# Patient Record
Sex: FEMALE | Race: WHITE | URBAN - METROPOLITAN AREA
[De-identification: names, ages, dates, MRNs, and addresses within clinical notes are randomized per-mention and may not be internally consistent; named-entity substitution may affect disease eponyms.]

---

## 2020-12-16 ENCOUNTER — HOSPITAL ENCOUNTER (OUTPATIENT)
Dept: PHYSICAL THERAPY | Age: 30
Setting detail: THERAPIES SERIES
Discharge: HOME | End: 2020-12-16
Payer: COMMERCIAL

## 2020-12-16 PROCEDURE — 97112 NEUROMUSCULAR REEDUCATION: CPT

## 2020-12-16 PROCEDURE — 97161 PT EVAL LOW COMPLEX 20 MIN: CPT

## 2020-12-16 ASSESSMENT — PAIN SCALES - GENERAL: PAINLEVEL_OUTOF10: 4

## 2020-12-16 ASSESSMENT — PAIN DESCRIPTION - ORIENTATION: ORIENTATION: RIGHT

## 2020-12-16 ASSESSMENT — PAIN DESCRIPTION - LOCATION: LOCATION: BACK

## 2020-12-16 ASSESSMENT — PAIN DESCRIPTION - PAIN TYPE: TYPE: CHRONIC PAIN

## 2020-12-16 NOTE — FLOWSHEET NOTE
[facility]  [facility]. and Therapy, [facility] [address] [place]   Phone: [phone]  Fax [phone]    Physical Therapy Daily Treatment Note    Date:  2020    Patient Name:  Maxine Sinha    :  1990  MRN: [id number]  Restrictions/Precautions:    Medical/Treatment Diagnosis Information:  · Diagnosis: lower back pain  Insurance/Certification information:  PT Insurance Information: Medical [place]  Physician Information:  Referring Practitioner: Rebecca De Leon MD  Plan of care signed (Y/N):  N  Visit# / total visits:       G-Code (if applicable):          DANIELA 15/50    Time in:   2:05      Timed Treatment: 10 Total Treatment Time:  45  ________________________________________________________________________________________    Pain Level:    /10  SUBJECTIVE:  See eval    OBJECTIVE:     Exercise/Equipment Resistance/Repetitions Other comments                                                                                                                                             Other Therapeutic Activities:  Pt educated on anatomy and biomechanics of lumbar spine, sacrum, SIJ, pelvis, hips, associated musculature and biomechanics of movement/ambulation.     Manual Treatments:         Modalities:      Test/Measurements:  See eval       ASSESSMENT:     See eval    Treatment/Activity Tolerance:   []Patient tolerated treatment well [] Patient limited by fatique  []Patient limited by pain [] Patient limited by other medical complications  [] Other:     Goals:    Short term goals  Time Frame for Short term goals: 4 weeks  Short term goal 1: pt will be indep in HEP  Short term goal 2: pt will decrease pain 50%  Short term goal 3: pt will return to clinic with symmetrical pelvic alignment  Short term goal 4: pt will increase B lumbopelvic stabilizer strength to 4/5  Short term goal 5: pt will ambulate with increase glut activation BLE Plan: [x] Continue per plan of care [] Alter current plan (see comments)   [x] Plan of care initiated [] Hold pending MD visit [] Discharge      Plan for Next Session: Biomechanical correction of problems as they present. Facilitate correct muscle firing patterns and activation with appropriate activities. Progress patient as tolerated.        Re-Certification Due Date:         Signature:  Aubrey Montero PT

## 2020-12-16 NOTE — PROGRESS NOTES
Physical Therapy  Initial Assessment  Date: 2020  Patient Name: Bobo Dickens  MRN: [id number]  : 1990     Restrictions  Position Activity Restriction  Other position/activity restrictions: none    Subjective   General  Chart Reviewed: Yes  Patient assessed for rehabilitation services?: Yes  Additional Pertinent Hx: N/A  Referring Practitioner: Michaelle Grigsby MD  Referral Date : 20  Diagnosis: lower back pain  General Comment  Comments: PLOF: indep in ADLs, lifiting heavy weights, playing dungeons and dragons  PT Visit Information  Onset Date: 20  PT Insurance Information: Medical [place]  Subjective  Subjective: Pt reports that she has a lot of low back pain. Notes that in the last 2-3 months, the pain has gotten worse. Really bad at night and in the morning with random flare ups during the day. Notes shooting pains from low back to R hip. Pt reports no radiating symptoms past her R hip. Denies N&T. Denies changes in B&B. Pt reports that sitting is her best position. Standing is the worse. Most comfortable lying position is on her side with pillow between the legs. No prior back pain. Sleeping is okay once she is asleep. Pt works as a psychologist full-time.   Pain Screening  Patient Currently in Pain: Yes  Pain Assessment  Pain Assessment: 0-10  Pain Level: 4  Pain Type: Chronic pain  Pain Location: Back  Pain Orientation: Right  Vital Signs  Patient Currently in Pain: Yes    Objective     Observation/Palpation  Posture: Fair  Palpation: standing - RLE pronation, iliac crest level; supine - RLE longer, R ASIS inferior; prone - R PSIS superior  Observation: L3/4 hinge, lumbar extension pattern upon return from flexion  Body Mechanics: gait - lumbar extension rotation, decreased push off and glut activation    AROM RLE (degrees)  R Hip External Rotation 0-45: 0-20*  PROM LLE (degrees)  L Hip External Rotation 0-45: 0-25*  Spine  Lumbar: WNL in all directions  Joint Mobility  Spine:

## 2020-12-23 ENCOUNTER — HOSPITAL ENCOUNTER (OUTPATIENT)
Dept: PHYSICAL THERAPY | Age: 30
Setting detail: THERAPIES SERIES
Discharge: HOME | End: 2020-12-23
Payer: COMMERCIAL

## 2020-12-23 NOTE — PROGRESS NOTES
Physical Therapy  Cancellation/No-show Note  Patient Name:  Junior Deleon  :  1990   Date:  2020  Cancels to date: 1  No-shows to date: 0    For today's appointment patient:  [x] Cancelled  [] Rescheduled appointment  [] No-show     Reason given by patient:  [] Patient ill  [] Conflicting appointment  [] No transportation    [] Conflict with work  [] No reason given  [x] Other:     Comments:  Pt arrived 15 minutes late, stated that she thought her appointment was at 8:15. She then rescheduled for tomorrow morning.     Electronically signed by:  Paula Casas PT

## 2020-12-24 ENCOUNTER — HOSPITAL ENCOUNTER (OUTPATIENT)
Dept: PHYSICAL THERAPY | Age: 30
Setting detail: THERAPIES SERIES
Discharge: HOME | End: 2020-12-24
Payer: COMMERCIAL

## 2020-12-24 PROCEDURE — 97110 THERAPEUTIC EXERCISES: CPT

## 2020-12-24 PROCEDURE — 97140 MANUAL THERAPY 1/> REGIONS: CPT

## 2020-12-24 NOTE — FLOWSHEET NOTE
[facility], [facility], [address] [facility] [facility], [address] [place]   Phone: [phone]  Fax [phone]    Physical Therapy Daily Treatment Note    Date:  2020    Patient Name:  Abdiel Mcknight    :  1990  MRN: [id number]  Restrictions/Precautions:    Medical/Treatment Diagnosis Information:  · Diagnosis: lower back pain  Insurance/Certification information:  PT Insurance Information: Medical [place]  Physician Information:  Referring Practitioner: Angie Carmona MD  Plan of care signed (Y/N):  N  Visit# / total visits:       G-Code (if applicable):          DANIELA 15/50    Time in:   7:30      Timed Treatment: 45 Total Treatment Time:  45  ________________________________________________________________________________________    Pain Level:    /10  SUBJECTIVE:  Pt reports that she has had a lot of pain in her hips and back the last week. OBJECTIVE:     Exercise/Equipment Resistance/Repetitions Other comments   TG 5 min End of session   bridge x10    TA set     BKF 5 min  x10 B BP cuff   Supine clams  SL hip ER with stabilization x10  x10 B Blue TB                                                                                                                 Other Therapeutic Activities:  Pt educated on anatomy and biomechanics of lumbar spine, sacrum, SIJ, pelvis, hips, associated musculature and biomechanics of movement/ambulation. Manual Treatments: scissors MET for R AI; shotgun technique with cavitation        Modalities:      Test/Measurements:  See eval       ASSESSMENT:     Pt tolerated session well. Corrected SIJD through manual therapy. Initiated HEP without issue.     Treatment/Activity Tolerance:   []Patient tolerated treatment well [] Patient limited by fatique  []Patient limited by pain [] Patient limited by other medical complications  [] Other:     Goals:    Short term goals  Time Frame for Short term goals: 4 weeks  Short term goal 1: pt will be indep in HEP  Short term goal 2: pt will decrease pain 50%  Short term goal 3: pt will return to clinic with symmetrical pelvic alignment  Short term goal 4: pt will increase B lumbopelvic stabilizer strength to 4/5  Short term goal 5: pt will ambulate with increase glut activation BLE           Plan: [x] Continue per plan of care [] Alter current plan (see comments)   [] Plan of care initiated [] Hold pending MD visit [] Discharge      Plan for Next Session: Biomechanical correction of problems as they present. Facilitate correct muscle firing patterns and activation with appropriate activities. Progress patient as tolerated.        Re-Certification Due Date:         Signature:  Glinda Cushing , PT

## 2020-12-29 ENCOUNTER — HOSPITAL ENCOUNTER (OUTPATIENT)
Dept: PHYSICAL THERAPY | Age: 30
Setting detail: THERAPIES SERIES
Discharge: HOME | End: 2020-12-29
Payer: COMMERCIAL

## 2020-12-29 PROCEDURE — 97110 THERAPEUTIC EXERCISES: CPT

## 2020-12-29 PROCEDURE — 97140 MANUAL THERAPY 1/> REGIONS: CPT

## 2020-12-29 NOTE — FLOWSHEET NOTE
[facility]. and Therapy, [facility] [place], [address] [place] [address]  Phone: [phone]  Fax [phone]    Physical Therapy Daily Treatment Note    Date:  2020    Patient Name:  Katrin Corea    :  1990  MRN: [id number]  Restrictions/Precautions:    Medical/Treatment Diagnosis Information:  · Diagnosis: lower back pain  Insurance/Certification information:  PT Insurance Information: Medical [place]  Physician Information:  Referring Practitioner: Beverly Gonzalez MD  Plan of care signed (Y/N):  N  Visit# / total visits:  3/8     G-Code (if applicable):          DANIELA 15/50    Time in:   11:30     Timed Treatment: 40 Total Treatment Time:  40  ________________________________________________________________________________________    Pain Level:    /10  SUBJECTIVE:  Pt reports that she has been having some pain in her hips at night. \"Intense pains. \" Pt does note that she has been having less lower back pain. OBJECTIVE:     Exercise/Equipment Resistance/Repetitions Other comments   TG 5 min End of session   bridge x10    TA set     BKF     Marches 15x5\"  x10 B  x10 B BP cuff   Supine clams  SL hip ER with stabilization  Blue TB   Lumbopelvic stabilization 2x1min    PHE  x10 B                                                                                                    Other Therapeutic Activities:  Pt educated on anatomy and biomechanics of lumbar spine, sacrum, SIJ, pelvis, hips, associated musculature and biomechanics of movement/ambulation. [id number]    Manual Treatments: scissors MET for R AI; shotgun technique with cavitation; BLE hip inferior distraction      Modalities:      Test/Measurements:  See eval       ASSESSMENT:     Pt tolerated session well. Corrected SIJD through manual therapy. Pt required moderate verbal cueing for lumbopelvic stabilization.      Treatment/Activity Tolerance:   []Patient tolerated treatment well [] Patient limited by fatique  []Patient limited by pain [] Patient limited by other medical complications  [] Other:     Goals:    Short term goals  Time Frame for Short term goals: 4 weeks  Short term goal 1: pt will be indep in HEP  Short term goal 2: pt will decrease pain 50%  Short term goal 3: pt will return to clinic with symmetrical pelvic alignment  Short term goal 4: pt will increase B lumbopelvic stabilizer strength to 4/5  Short term goal 5: pt will ambulate with increase glut activation BLE           Plan: [x] Continue per plan of care [] Alter current plan (see comments)   [] Plan of care initiated [] Hold pending MD visit [] Discharge      Plan for Next Session: Biomechanical correction of problems as they present. Facilitate correct muscle firing patterns and activation with appropriate activities. Progress patient as tolerated.        Re-Certification Due Date:         Signature:  Maria Del Carmen Hobson , PT

## 2020-12-31 ENCOUNTER — HOSPITAL ENCOUNTER (OUTPATIENT)
Dept: PHYSICAL THERAPY | Age: 30
Setting detail: THERAPIES SERIES
Discharge: HOME | End: 2020-12-31
Payer: COMMERCIAL

## 2020-12-31 PROCEDURE — 97140 MANUAL THERAPY 1/> REGIONS: CPT

## 2020-12-31 PROCEDURE — 97110 THERAPEUTIC EXERCISES: CPT

## 2020-12-31 NOTE — FLOWSHEET NOTE
[facility]  [facility] and Therapy, [facility] [address], [address] [place]   Phone: [phone]  Fax [phone]    Physical Therapy Daily Treatment Note    Date:  2020    Patient Name:  Dante Zavaleta    :  1990  MRN: [id number]  Restrictions/Precautions:    Medical/Treatment Diagnosis Information:  · Diagnosis: lower back pain  Insurance/Certification information:  PT Insurance Information: Medical [place]  Physician Information:  Referring Practitioner: Desiree Hooper MD  Plan of care signed (Y/N):  N  Visit# / total visits:       G-Code (if applicable):          DANIELA 15/50    Time in:   8:22     Timed Treatment: 40 Total Treatment Time:  40  ________________________________________________________________________________________    Pain Level:    /10  SUBJECTIVE:  Pt reports that her hips and back are painful today. Notes that she went to the gym yesterday and is renovating her bathroom. OBJECTIVE:     Exercise/Equipment Resistance/Repetitions Other comments   TG 5 min End of session   bridge x10    TA set     BKF     Marches 15x5\"  x10 B  x10 B BP cuff   Supine clams  SL hip ER with stabilization  Blue TB   Lumbopelvic stabilization 2x1min    PHE  x10 B    Quadruped multif hip lift x10 B                                                                                             Other Therapeutic Activities:  Pt educated on anatomy and biomechanics of lumbar spine, sacrum, SIJ, pelvis, hips, associated musculature and biomechanics of movement/ambulation. [id number]    Manual Treatments: scissors MET for R AI; shotgun technique with cavitation; BLE hip inferior distraction      Modalities:      Test/Measurements:  See eval       ASSESSMENT:     Pt tolerated session well. Corrected SIJD through manual therapy. Pt required moderate verbal cueing for lumbopelvic stabilization.      Treatment/Activity Tolerance:   []Patient tolerated treatment well [] Patient limited by fatique  []Patient limited by pain [] Patient limited by other medical complications  [] Other:     Goals:    Short term goals  Time Frame for Short term goals: 4 weeks  Short term goal 1: pt will be indep in HEP  Short term goal 2: pt will decrease pain 50%  Short term goal 3: pt will return to clinic with symmetrical pelvic alignment  Short term goal 4: pt will increase B lumbopelvic stabilizer strength to 4/5  Short term goal 5: pt will ambulate with increase glut activation BLE           Plan: [x] Continue per plan of care [] Alter current plan (see comments)   [] Plan of care initiated [] Hold pending MD visit [] Discharge      Plan for Next Session: Biomechanical correction of problems as they present. Facilitate correct muscle firing patterns and activation with appropriate activities. Progress patient as tolerated.        Re-Certification Due Date:         Signature:  Rosita Tillman PT

## 2021-01-05 ENCOUNTER — HOSPITAL ENCOUNTER (OUTPATIENT)
Dept: PHYSICAL THERAPY | Age: 31
Setting detail: THERAPIES SERIES
Discharge: HOME | End: 2021-01-05

## 2021-01-05 PROCEDURE — 97110 THERAPEUTIC EXERCISES: CPT

## 2021-01-05 NOTE — FLOWSHEET NOTE
[facility], [facility], [address]  Phone: [phone]  Fax [phone]    Physical Therapy Daily Treatment Note    Date:  2021    Patient Name:  Cesar Veliz    :  1990  MRN: [id number]  Restrictions/Precautions:    Medical/Treatment Diagnosis Information:  · Diagnosis: lower back pain  Insurance/Certification information:  PT Insurance Information: Medical [place]  Physician Information:  Referring Practitioner: Eleonora Vidales MD  Plan of care signed (Y/N):  N  Visit# / total visits:       G-Code (if applicable):          DANIELA 15/50    Time in:   8:22     Timed Treatment: 40 Total Treatment Time:  40  ________________________________________________________________________________________    Pain Level:    /10  SUBJECTIVE:  Pt reports that she is having less pain overall. Notes this morning she is only having a little pain. Started Taekwondo yesterday. OBJECTIVE:     Exercise/Equipment Resistance/Repetitions Other comments   TG 5 min End of session   bridge x10    TA set     BKF     Marches 15x5\"  x10 B  x10 B BP cuff   Supine clams  SL hip ER with stabilization  Blue TB   Lumbopelvic stabilization 2x1min    PHE     Quadruped multif hip lift     3-way ball compression 5x10\" ea                    multif press x15 B    B shoulder extension x15                                                       Other Therapeutic Activities:  Pt educated on anatomy and biomechanics of lumbar spine, sacrum, SIJ, pelvis, hips, associated musculature and biomechanics of movement/ambulation. [id number]    Manual Treatments: ; shotgun technique with cavitation; BLE hip inferior distraction      Modalities:      Test/Measurements:  See eval       ASSESSMENT:     Pt tolerated session well. No SIJD this session. Pt required moderate verbal cueing for lumbopelvic stabilization.  Able to progress to standing exercise this session. Treatment/Activity Tolerance:   [x]Patient tolerated treatment well [] Patient limited by fatique  []Patient limited by pain [] Patient limited by other medical complications  [] Other:     Goals:    Short term goals  Time Frame for Short term goals: 4 weeks  Short term goal 1: pt will be indep in HEP  Short term goal 2: pt will decrease pain 50%  Short term goal 3: pt will return to clinic with symmetrical pelvic alignment  Short term goal 4: pt will increase B lumbopelvic stabilizer strength to 4/5  Short term goal 5: pt will ambulate with increase glut activation BLE           Plan: [x] Continue per plan of care [] Alter current plan (see comments)   [] Plan of care initiated [] Hold pending MD visit [] Discharge      Plan for Next Session: Biomechanical correction of problems as they present. Facilitate correct muscle firing patterns and activation with appropriate activities. Progress patient as tolerated.        Re-Certification Due Date:         Signature:  Robert Carrasquillo , PT

## 2021-01-07 ENCOUNTER — HOSPITAL ENCOUNTER (OUTPATIENT)
Dept: PHYSICAL THERAPY | Age: 31
Setting detail: THERAPIES SERIES
Discharge: HOME | End: 2021-01-07

## 2021-01-07 NOTE — PROGRESS NOTES
Physical Therapy  Cancellation/No-show Note  Patient Name:  Dante Zavaleta  :  1990   Date:  2021  Cancels to date: 1  No-shows to date: 1    For today's appointment patient:  [] Cancelled  [] Rescheduled appointment  [x] No-show     Reason given by patient:  [] Patient ill  [] Conflicting appointment  [] No transportation    [] Conflict with work  [] No reason given  [] Other:     Comments:      Electronically signed by:  Elver Osborne PT

## 2021-01-12 ENCOUNTER — HOSPITAL ENCOUNTER (OUTPATIENT)
Dept: PHYSICAL THERAPY | Age: 31
Setting detail: THERAPIES SERIES
Discharge: HOME | End: 2021-01-12

## 2021-01-12 PROCEDURE — 97110 THERAPEUTIC EXERCISES: CPT

## 2021-01-12 NOTE — FLOWSHEET NOTE
[facility], [facility], [address] [facility], [address] [place] [address] [address]  Phone: [phone]  Fax [phone]    Physical Therapy Daily Treatment Note    Date:  2021    Patient Name:  Chyna Ronquillo    :  1990  MRN: [id number]  Restrictions/Precautions:    Medical/Treatment Diagnosis Information:  · Diagnosis: lower back pain  Insurance/Certification information:  PT Insurance Information: Medical [place]  Physician Information:  Referring Practitioner: Juan Rosario MD  Plan of care signed (Y/N):  N  Visit# / total visits:       G-Code (if applicable):          DANIELA 15/50    Time in:   11:20     Timed Treatment: 40 Total Treatment Time:  40    Pt cancelled her 8:15 appointment and then rescheduled for 11:15 the same morning.  ________________________________________________________________________________________    Pain Level:    /10  SUBJECTIVE:  Pt reports that her pain episodes are less frequent and less severe. Notes that she is now able to lie on her back without pain. OBJECTIVE:     Exercise/Equipment Resistance/Repetitions Other comments   TG 5 min End of session   bridge x10    TA set     BKF     Marches     Slide out 15x5\"  x10 B  x10 B  x10 B BP cuff   Supine clams  SL hip ER with stabilization  Blue TB   Lumbopelvic stabilization 2x1min    PHE     Quadruped multif hip lift     3-way ball compression 5x10\" ea                    multif press x15 B    B shoulder extension x15    Standing hip abduction x15 B 15#                                               Other Therapeutic Activities:  Pt educated on anatomy and biomechanics of lumbar spine, sacrum, SIJ, pelvis, hips, associated musculature and biomechanics of movement/ambulation.     [id number]    Manual Treatments: ; shotgun technique with cavitation; BLE hip inferior distraction      Modalities:      Test/Measurements:  See eval       ASSESSMENT:     Pt tolerated session well. No SIJD this session. Pt required moderate verbal cueing for lumbopelvic stabilization. Able to progress to standing exercise this session. Treatment/Activity Tolerance:   [x]Patient tolerated treatment well [] Patient limited by fatique  []Patient limited by pain [] Patient limited by other medical complications  [] Other:     Goals:    Short term goals  Time Frame for Short term goals: 4 weeks  Short term goal 1: pt will be indep in HEP  Short term goal 2: pt will decrease pain 50%  Short term goal 3: pt will return to clinic with symmetrical pelvic alignment  Short term goal 4: pt will increase B lumbopelvic stabilizer strength to 4/5  Short term goal 5: pt will ambulate with increase glut activation BLE           Plan: [x] Continue per plan of care [] Alter current plan (see comments)   [] Plan of care initiated [] Hold pending MD visit [] Discharge      Plan for Next Session: Biomechanical correction of problems as they present. Facilitate correct muscle firing patterns and activation with appropriate activities. Progress patient as tolerated.        Re-Certification Due Date:         Signature:  Dayron Meyer PT

## 2021-01-12 NOTE — PROGRESS NOTES
Physical Therapy  Cancellation/No-show Note  Patient Name:  Rhea Sahni  :  1990   Date:  2021  Cancels to date: 2  No-shows to date: 1    For today's appointment patient:  [x] Cancelled  [] Rescheduled appointment  [] No-show     Reason given by patient:  [] Patient ill  [] Conflicting appointment  [] No transportation    [] Conflict with work  [] No reason given  [x] Other:  Needs to take care of her kids.    Comments:      Electronically signed by:  Jeramie Wallace PT

## 2021-01-14 ENCOUNTER — APPOINTMENT (OUTPATIENT)
Dept: PHYSICAL THERAPY | Age: 31
End: 2021-01-14

## 2021-01-28 ENCOUNTER — HOSPITAL ENCOUNTER (OUTPATIENT)
Dept: PHYSICAL THERAPY | Age: 31
Setting detail: THERAPIES SERIES
Discharge: HOME | End: 2021-01-28

## 2021-01-28 NOTE — PROGRESS NOTES
Physical Therapy  Cancellation/No-show Note  Patient Name:  Ana Beaver  :  1990   Date:  2021  Cancels to date: 2  No-shows to date: 2    For today's appointment patient:  [] Cancelled  [] Rescheduled appointment  [x] No-show     Reason given by patient:  [] Patient ill  [] Conflicting appointment  [] No transportation    [] Conflict with work  [] No reason given  [] Other:  Needs to take care of her kids.    Comments:      Electronically signed by:  Oz Hendrix PT